# Patient Record
Sex: FEMALE | Race: WHITE | Employment: FULL TIME | URBAN - METROPOLITAN AREA
[De-identification: names, ages, dates, MRNs, and addresses within clinical notes are randomized per-mention and may not be internally consistent; named-entity substitution may affect disease eponyms.]

---

## 2020-12-31 ENCOUNTER — HOSPITAL ENCOUNTER (OUTPATIENT)
Dept: GENERAL RADIOLOGY | Age: 65
Discharge: HOME | End: 2020-12-31
Attending: NURSE PRACTITIONER
Payer: MEDICARE

## 2020-12-31 DIAGNOSIS — M54.40 ACUTE RIGHT-SIDED LOW BACK PAIN WITH SCIATICA, SCIATICA LATERALITY UNSPECIFIED: ICD-10-CM

## 2020-12-31 PROCEDURE — 72100 X-RAY EXAM L-S SPINE 2/3 VWS: CPT

## 2021-01-04 NOTE — PROGRESS NOTES
Pt notified of lumbar xray WNL. Advised to  continue with plan, and notify us if symptoms do not continue to improve.   HARSHADH/db

## 2021-01-27 ENCOUNTER — HOSPITAL ENCOUNTER (OUTPATIENT)
Dept: MRI IMAGING | Age: 66
Discharge: HOME | End: 2021-01-27
Attending: FAMILY MEDICINE
Payer: MEDICARE

## 2021-01-27 DIAGNOSIS — M54.9 BACK PAIN WITH RADIATION: ICD-10-CM

## 2021-01-27 PROCEDURE — 72148 MRI LUMBAR SPINE W/O DYE: CPT

## 2021-01-28 NOTE — PROGRESS NOTES
Per Dr. Sarita Cee: Notify shows arthritis and mild narrowing around nerve roots which is irritating to nerve. Does not not look like the type of thing that would need surgery but would refer to POA to see if injections would help and to get baseline exam.- Notified, verbalizes understanding. Would like to hold off on Ortho referral at this time. Prefers to try PT first. Will call back for this referral if needed.

## 2021-02-15 ENCOUNTER — HOSPITAL ENCOUNTER (OUTPATIENT)
Dept: PHYSICAL THERAPY | Age: 66
Discharge: HOME | End: 2021-02-15
Payer: MEDICARE

## 2021-02-15 PROCEDURE — 97110 THERAPEUTIC EXERCISES: CPT

## 2021-02-15 PROCEDURE — 97161 PT EVAL LOW COMPLEX 20 MIN: CPT

## 2021-02-15 NOTE — THERAPY EVALUATION
Eduardo Velez : 1955 [place] Secondary: [address] [facility] Therapy 
[address], [address] [phone]   [phone] OUTPATIENT PHYSICAL THERAPY:Initial Assessment 2/15/2021 ICD-10: Treatment Diagnosis: M54.5 Precautions/Allergies:  
Sulfa (sulfonamide antibiotics) TREATMENT PLAN: 
Effective Dates: 2/15/2021 TO 2021 (90 days). Frequency/Duration: 2 times a week for 90 Day(s) MEDICAL/REFERRING DIAGNOSIS: 
Dorsalgia, unspecified [M54.9] DATE OF ONSET: 2020 REFERRING PHYSICIAN: Viet Alfonso MD MD Orders: Maria A Ivan and treat Return MD Appointment:   
 
INITIAL ASSESSMENT:  Ms. Forrest Meehan presents to physical therapy with MD diagnosis of a low back pain. Pt demonstrated signs and symptoms consistent with this diagnosis. On objective examination, the patient demonstrated deficits in ROM, strength, joint mobility, soft tissue mobility. The patient also had increased pain. The patient is limited in the following activities: sitting, standing, walking, lifting, ADLs, work. The patient has a good  prognosis for recovery based on the examination findings and may be limited by: N/A. Patient requires skilled physical therapy services in order to return to prior level of function. PROBLEM LIST (Impacting functional limitations): 1. Decreased Strength 2. Decreased ADL/Functional Activities 3. Increased Pain 4. Decreased Activity Tolerance 5. Decreased Flexibility/Joint Mobility 6. Decreased Knowledge of Precautions 7. Decreased [place] with Home Exercise Program INTERVENTIONS PLANNED: (Treatment may consist of any combination of the following) 1. Cold 2. Heat 3. Home Exercise Program (HEP) 4. Manual Therapy 5. Neuromuscular Re-education/Strengthening 6. Range of Motion (ROM) 7. Therapeutic Activites 8. Therapeutic Exercise/Strengthening GOALS: (Goals have been discussed and agreed upon with patient.) Short-Term Functional Goals: Time Frame: 4 weeks 1. Pt will improve BAILEY score by 5pts 2. Pt will be compliant with progressive HEP Discharge Goals: Time Frame: 10 weeks 1. Pt will improve BAILEY score by 10pts 2. Pt will have 4+/5 glute max strength 3. Pt will be able to bend and lift 15lbs OUTCOME MEASURE:  
Tool Used: Modified Oswestry Low Back Pain Questionnaire Score:  Initial: 17/50  Most Recent: X/50 (Date: -- ) Interpretation of Score: Each section is scored on a 0-5 scale, 5 representing the greatest disability. The scores of each section are added together for a total score of 50. MEDICAL NECESSITY:  
· Patient is expected to demonstrate progress in strength, range of motion, coordination and functional technique to increase independence with ADLs and functionl tasks. REASON FOR SERVICES/OTHER COMMENTS: 
· Patient requires skilled physical therapy in order to return to prior level of function. Total Duration: 
  
 
Rehabilitation Potential For Stated Goals: Good Regarding Kim Fuentes's therapy, I certify that the treatment plan above will be carried out by a therapist or under their direction. Thank you for this referral, 
Carleen Rogers PT, DPT, OCS Referring Physician Signature: Sae Vasquez MD _______________________________ Date _____________ PAIN/SUBJECTIVE:  
Initial:   3/10 Post Session:  1/10 HISTORY:  
History of Injury/Illness (Reason for Referral): 
 Pt states her back started bothering her on 12/26/2020. Was doing a lot of lifting and getting things out of the attic, so she was stooping over and bending, as well as cleaning; no specific incident, just a lot of bending. Is sitting a lot with work now that she is at home. Reports that the pain is from the right hip to the right foot into the toes. Denies any L LE pain. Pain starts in the R hip, doesn't feel much in the R thigh, but feels pain/numb in the lateral shin/calf and into the toes and under the foot. Pt does not know if she has any weakness in the R LE. Majority of the numbness is in the bottom of the foot, some in the shin. Aggs: sitting, bending Eases: lying on back, standing, walking Past Medical History/Comorbidities: Ms. Yamilex Moses  has a past medical history of Abdominal abscess (6/15/2012), Abdominal pain (6/13/2012), Gangrenous appendicitis (6/15/2012), Peritonitis ([address] (6/15/2012), and Ruptured appendicitis (6/15/2012). She also has no past medical history of Aneurysm ([address] Arrhythmia, Arthritis, Asthma, Autoimmune disease ([address] CAD (coronary artery disease), Cancer ([address] Chronic kidney disease, Chronic pain, Coagulation defects, COPD, Diabetes ([address] GERD (gastroesophageal reflux disease), Heart failure ([address] Hypertension, Liver disease, Morbid obesity ([address] Other ill-defined conditions(799.89), Psychiatric disorder, PUD (peptic ulcer disease), Seizures ([address] Stroke ([address] Thromboembolus ([address] Thyroid disease, or Unspecified sleep apnea. Ms. Yamilex Moses  has a past surgical history that includes hx appendectomy. Social History/Living Environment:  
  Lives with family, no stairs Prior Level of Function/Work/Activity: 
Desk job, walking, house work Personal Factors:   
      Sex:  female Age:  76 y.o. Ambulatory/Rehab Services H2 Model Falls Risk Assessment Risk Factors: 
     No Risk Factors Identified Ability to Rise from Chair: (0)  Ability to rise in a single movement Falls Prevention Plan: No modifications necessary Total: (5 or greater = High Risk): 0  
©2010 [facility] of [employer] States Patent #[id number]. Federal Law prohibits the replication, distribution or use without written permission from [facility] of [employer] Current Medications:   
  
Current Outpatient Medications:  
  LORazepam (ATIVAN) 1 mg tablet, 1 po 1 hour prior to procedure. May repeat q4h if needed. , Disp: 4 Tab, Rfl: 0 
  methylPREDNISolone (MEDROL DOSEPACK) 4 mg tablet, TAD, Disp: 1 Dose Pack, Rfl: 0 
  gabapentin (NEURONTIN) 100 mg capsule, Take 3 Caps by mouth nightly as needed for Pain., Disp: 20 Cap, Rfl: 0 
  cyclobenzaprine (FLEXERIL) 10 mg tablet, Take 1 Tab by mouth nightly., Disp: 20 Tab, Rfl: 0 Date Last Reviewed:  02/15/21 Number of Personal Factors/Comorbidities that affect the Plan of Care: 1-2: MODERATE COMPLEXITY EXAMINATION:  
*= Painful     WNL= within normal limits     NT= not tested Observation Posture: normal 
Gait: normal 
Lateral Shift: none ROM Right Left Flexion 98deg Extension 32deg R/L Side Bend  35deg 35deg Strength (all MMT scores are graded on a scale of 0-5) Right Left Hip Flexion 5 5 Abduction 3+ 3+ Extension 4- 4-  
Glute Max 3+ 3+ Knee Extension 5 5 Flexion 5 5 Ankle Dorsiflexion 5 5 Plantarflexion 5 5 Neuro Screen All dermatomes and myotoms normal (some increased difficulty with SL calf raises on R LE) Reflexes Right Left Patellar Normal Normal  
Achilles Absent Normal  
 
 
Joint/Soft Tissue Mobility Description Joint Mobility ? Lumbar: Normal mobility, slight increase in pain with UPA/CPA at L4-5 Soft Tissue Mobility No TTP Special Tests Straight Leg Raise Right: (+) at 0deg (symptoms onset with DF in neutral) Left: (-) Slump: (+) Body Structures Involved: 1. Nerves 2. Bones 3. Joints 4. Muscles 5. Ligaments Body Functions Affected: 1. Sensory/Pain 2. Neuromusculoskeletal 
3. Movement Related Activities and Participation Affected: 1. General Tasks and Demands 2. Self Care 3. Domestic Life 4. Interpersonal Interactions and Relationships 5. Community, Social and [place] Number of elements (examined above) that affect the Plan of Care: 3: MODERATE COMPLEXITY CLINICAL PRESENTATION:  
Presentation: Stable and uncomplicated: LOW COMPLEXITY CLINICAL DECISION MAKING:  
Use of outcome tool(s) and clinical judgement create a POC that gives a: Clear prediction of patient's progress: LOW COMPLEXITY Rondall Romberg PT, DPT, OCS

## 2021-02-15 NOTE — PROGRESS NOTES
Roddy Hurd  : 1955  [place]  Secondary: [address] [facility] Therapy  [address], [address]  [phone]   [phone]         OUTPATIENT PHYSICAL THERAPY:Daily Note 2/15/2021      TREATMENT:   PT Patient Time In/Time Out  Time In: 1430  Time Out: 1515      Total Time: 45min  Visit Count:  1     ICD-10: Treatment Diagnosis: M54.5  Medication Last Reviewed: 02/15/21      TREATMENT PLAN  Effective Dates: 2/15/2021 TO 2021 (90 days). Frequency/Duration: 2 times a week for 90 Day(s)         Subjective: See Evaluation Note dated 2/15/2021 for details   Pain:     Objective: See Evaluation Note dated 2/15/2021 for details      Therapeutic Exercise: (30min) Done in order to improve strength, ROM and understanding of current condition. Date:  2/15 Date:   Date:   Date:     Activity/Exercise Parameters      Education Discussed examination findings, HEP, plan of care      Prone Press Ups 5x10 (3x10 w/ pillow under hips)      Bridges 3x10      Standing Lumbar Extension 2x10                      Access Code: [email or web address]  URL: [email or web address]  Date: 02/15/2021  Prepared by: Ashley Christian    Exercises  Supine Bridge - 10 reps - 3 sets - 2x daily - 7x weekly  Prone Press Up - 7x weekly    Manual Therapy: (0min) Done in order to improve joint and soft tissue mobility,reduce muscle guarding, and decrease muscle tone   Date:   Date:   Date:   Date:     Type Parameters      Joint Mobilization       Soft Tissue Mobilization           Modalities: (-) Done in order to reduce swelling and pain    Assessment: See Evaluation Note dated 2/15/2021 for details    Plan: See Evaluation Note dated 2/15/2021 for details    No future appointments.     Unbilled Time: 15min eval  Units: 1 eval low/ 2TE      Argelia Render, PT, DPT, OCS    Visit Approval Visit # Therapist initials Date A NS / Cx < 24 hr >24 hr Cx Comments    1 WJ 2/15 [x]  [] [] Initial evaluation       [] [] []        [] [] []        [] [] []        [] [] []        [] [] []        [] [] []        [] [] []        [] [] []        [] [] []        [] [] []        [] [] []        [] [] []        [] [] []        [] [] []        [] [] []        [] [] []        [] [] []

## 2021-02-19 ENCOUNTER — HOSPITAL ENCOUNTER (OUTPATIENT)
Dept: PHYSICAL THERAPY | Age: 66
Discharge: HOME | End: 2021-02-19
Payer: MEDICARE

## 2021-02-19 PROCEDURE — 97110 THERAPEUTIC EXERCISES: CPT

## 2021-02-19 NOTE — PROGRESS NOTES
Kevin Adkinsic  : 1955  [place]  Secondary: [address] [facility] Therapy  [address], [facility], [address]  [phone]   [phone]         OUTPATIENT PHYSICAL THERAPY:Daily Note 2021      TREATMENT:   PT Patient Time In/Time Out  Time In: 0200  Time Out: 0245      Total Time: 45min  Visit Count:  2     ICD-10: Treatment Diagnosis: M54.5  Medication Last Reviewed: 21      TREATMENT PLAN  Effective Dates: 2/15/2021 TO 2021 (90 days). Frequency/Duration: 2 times a week for 90 Day(s)         Subjective: Patient reports she is a little more painful in the mornings.  Pain:     Objective: None Today      Therapeutic Exercise: (45min) Done in order to improve strength, ROM and understanding of current condition. Date:  2/15 Date:   Date:   Date:     Activity/Exercise Parameters      Education Discussed examination findings, HEP, plan of care      Prone Press Ups 5x10 (3x10 w/ pillow under hips) 5 x 10 with overpressure     Bridges 3x10 3 x 10     Standing Lumbar Extension 2x10 3 x 10     Nu step   10 min     SLR  3 x 10     Trunk rotation   3 x 10     piriformis stretching  3 x 1 min      Hamstring stretching   3 x 1 min     Hip abd sidelying   1 x 10 ( had some difficulty)     Clams   2 x 10       Access Code: [email or web address]  URL: [email or web address]  Date: 02/15/2021  Prepared by: Anne Terry    Exercises  Supine Bridge - 10 reps - 3 sets - 2x daily - 7x weekly  Prone Press Up - 7x weekly    Manual Therapy: (0min) Done in order to improve joint and soft tissue mobility,reduce muscle guarding, and decrease muscle tone   Date:   Date:   Date:   Date:     Type Parameters      Joint Mobilization       Soft Tissue Mobilization           Modalities: (-) Done in order to reduce swelling and pain    Assessment: Patient tolerated treatment with mild discomfort.  Needs to work on core strength.      Plan: See Evaluation Note dated 2/15/2021 for details    Future Appointments   Date Time Provider Brian Mcmillan   2/19/2021  2:00 PM [facility]   2/26/2021  2:45 PM [facility]   3/1/2021  4:00 PM [facility] [place]   3/3/2021  4:00 PM Daryel [place], PT [facility]   3/8/2021  4:00 PM Daryel [place], PT [facility]   3/12/2021  2:45 PM Daryel [place], PT [facility]       Mary Aaron Time:   Units: 3 TE      Duglas You, DPT, OCS    Visit Approval Visit # Therapist initials Date A NS / Cx < 24 hr >24 hr Cx Comments    1 WJ 2/15 [x]  [] [] Initial evaluation    2  [address] 2-19 [x] [] []        [] [] []        [] [] []        [] [] []        [] [] []        [] [] []        [] [] []        [] [] []        [] [] []        [] [] []        [] [] []        [] [] []        [] [] []        [] [] []        [] [] []        [] [] []        [] [] []

## 2021-02-26 ENCOUNTER — HOSPITAL ENCOUNTER (OUTPATIENT)
Dept: PHYSICAL THERAPY | Age: 66
Discharge: HOME | End: 2021-02-26
Payer: MEDICARE

## 2021-02-26 PROCEDURE — 97110 THERAPEUTIC EXERCISES: CPT

## 2021-02-26 NOTE — PROGRESS NOTES
Roddy Hurd  : 1955  [place]  Secondary: [address] [facility] Therapy  [address], [facility], [address]  [phone]   [phone]         OUTPATIENT PHYSICAL THERAPY:Daily Note 2021      TREATMENT:   PT Patient Time In/Time Out  Time In: 2175  Time Out: 0315      Total Time: 45min  Visit Count:  3     ICD-10: Treatment Diagnosis: M54.5  Medication Last Reviewed: 21      TREATMENT PLAN  Effective Dates: 2/15/2021 TO 2021 (90 days). Frequency/Duration: 2 times a week for 90 Day(s)         Subjective: Patient reports she has been sore.  Pain: 1/10    Objective: None Today      Therapeutic Exercise: (45min) Done in order to improve strength, ROM and understanding of current condition. Date:  2/15 Date:   Date:  21 Date:     Activity/Exercise Parameters      Education Discussed examination findings, HEP, plan of care      Prone Press Ups 5x10 (3x10 w/ pillow under hips) 5 x 10 with overpressure     Bridges 3x10 3 x 10 3 x 10    Standing Lumbar Extension 2x10 3 x 10 3 x 10    Nu step   10 min 10 min level 2    SLR  3 x 10 3 x 10    Trunk rotation   3 x 10     piriformis stretching  3 x 1 min  3 x 1 min    Hamstring stretching   3 x 1 min     Hip abd sidelying   1 x 10 ( had some difficulty)     Clams   2 x 10     Hip abd standing   3 x 10    Sit to stand   2 x 12      Access Code: [email or web address]  URL: [email or web address]  Date: 02/15/2021  Prepared by:  Ashley Christian    Exercises  Supine Bridge - 10 reps - 3 sets - 2x daily - 7x weekly  Prone Press Up - 7x weekly    Manual Therapy: (0min) Done in order to improve joint and soft tissue mobility,reduce muscle guarding, and decrease muscle tone   Date:   Date:   Date:   Date:     Type Parameters      Joint Mobilization       Soft Tissue Mobilization           Modalities: (-) Done in order to reduce swelling and pain    Assessment: Patient tolerated treatment with mild discomfort. Patient demonstrated good effort with all exercises.     Plan: See Evaluation Note dated 2/15/2021 for details    Future Appointments   Date Time Provider Brian Mcmillan   3/1/2021  4:00 PM Tara Boykin   3/3/2021  4:00 PM Ugo Chavez, [facility]   3/8/2021  4:00 PM Ugo Chavez, PT [facility]   3/12/2021  2:45 PM Ugo Chavez, PT [facility]       Ankit Hernandes Time:   Units: 3 TE      Ricky Rock, DPT, OCS    Visit Approval Visit # Therapist initials Date A NS / Cx < 24 hr >24 hr Cx Comments    1 WJ 2/15 [x]  [] [] Initial evaluation    2  [address] 2-19 [x] [] []     3 [address] 2-26 [x] [] []        [] [] []        [] [] []        [] [] []        [] [] []        [] [] []        [] [] []        [] [] []        [] [] []        [] [] []        [] [] []        [] [] []        [] [] []        [] [] []        [] [] []        [] [] []

## 2021-03-01 ENCOUNTER — HOSPITAL ENCOUNTER (OUTPATIENT)
Dept: PHYSICAL THERAPY | Age: 66
Discharge: HOME | End: 2021-03-01
Payer: MEDICARE

## 2021-03-01 PROCEDURE — 97140 MANUAL THERAPY 1/> REGIONS: CPT

## 2021-03-01 PROCEDURE — 97110 THERAPEUTIC EXERCISES: CPT

## 2021-03-01 NOTE — PROGRESS NOTES
Pieter Adamson  : 1955  [place]  Secondary: [address] [facility] Therapy  [address], [address], [address]  [phone]   [phone]         OUTPATIENT PHYSICAL THERAPY:Daily Note 3/1/2021      TREATMENT:   PT Patient Time In/Time Out  Time In: 1603  Time Out: 1653      Total Time: 50min  Visit Count:  4     ICD-10: Treatment Diagnosis: M54.5  Medication Last Reviewed: 21      TREATMENT PLAN  Effective Dates: 2/15/2021 TO 2021 (90 days). Frequency/Duration: 2 times a week for 90 Day(s)         Subjective: Patient reports she has been sore.  Pain: 1/10    Objective: None Today      Therapeutic Exercise: (30min) Done in order to improve strength, ROM and understanding of current condition. Date:  2/15 Date:   Date:  21 Date:  3/1   Activity/Exercise Parameters      Education Discussed examination findings, HEP, plan of care   Mini-assessment   Prone Press Ups 5x10 (3x10 w/ pillow under hips) 5 x 10 with overpressure  4x10 (w/ right lateral shift)3x10   Bridges 3x10 3 x 10 3 x 10    Standing Lumbar Extension 2x10 3 x 10 3 x 10 2x10 (w/ right lateral shift)   Nu step   10 min 10 min level 2 x10min lvl 2-3   SLR  3 x 10 3 x 10    Trunk rotation   3 x 10     piriformis stretching  3 x 1 min  3 x 1 min    Hamstring stretching   3 x 1 min     Hip abd sidelying   1 x 10 ( had some difficulty)     Clams   2 x 10     Hip abd standing   3 x 10    Sit to stand   2 x 12    Lateral Shift    3x10 (hips to L) standing                                 Access Code: [email or web address]  URL: [email or web address]  Date: 02/15/2021  Prepared by:  Vivian Or    Exercises  Supine Bridge - 10 reps - 3 sets - 2x daily - 7x weekly  Prone Press Up - 7x weekly    Manual Therapy: (12min) Done in order to improve joint and soft tissue mobility,reduce muscle guarding, and decrease muscle tone   Date:  3/1 Date:   Date:   Date:     Type Parameters      Joint Mobilization L3-5: CPA/UPA grades II-III      Soft Tissue Mobilization           Modalities: (-) Done in order to reduce swelling and pain    Assessment: Patient tolerated treatment with mild discomfort. Patient demonstrated good effort with all exercises.     Plan: See Evaluation Note dated 2/15/2021 for details    Future Appointments   Date Time Provider Brian Mcmillan   3/3/2021  4:00 PM Tara Law   3/8/2021  4:00 PM PILY [facility]   3/12/2021  2:45 PM [facility]       Keiko Grebe Time: 7min unsupervised  Units: 2 TE/ 1MT      Ann Marie Whyte, PT, DPT, OCS    Visit Approval Visit # Therapist initials Date A NS / Cx < 24 hr >24 hr Cx Comments    1 WJ 2/15 [x]  [] [] Initial evaluation    2  [address] 2-19 [x] [] []     3 [address] 2-26 [x] [] []     4 WJ 3/1 [] [] []        [] [] []        [] [] []        [] [] []        [] [] []        [] [] []        [] [] []        [] [] []        [] [] []        [] [] []        [] [] []        [] [] []        [] [] []        [] [] []        [] [] []

## 2021-03-03 ENCOUNTER — HOSPITAL ENCOUNTER (OUTPATIENT)
Dept: PHYSICAL THERAPY | Age: 66
Discharge: HOME | End: 2021-03-03
Payer: MEDICARE

## 2021-03-03 PROCEDURE — 97110 THERAPEUTIC EXERCISES: CPT

## 2021-03-03 NOTE — PROGRESS NOTES
Virginia Bauer  : 1955  [place]  Secondary: [address] at [facility] Therapy  [address], [facility], [address]  [phone]   [phone]         OUTPATIENT PHYSICAL THERAPY:Daily Note 3/3/2021      TREATMENT:   PT Patient Time In/Time Out  Time In: 1600  Time Out: 1645      Total Time: 45min  Visit Count:  5     ICD-10: Treatment Diagnosis: M54.5  Medication Last Reviewed: 21      TREATMENT PLAN  Effective Dates: 2/15/2021 TO 2021 (90 days). Frequency/Duration: 2 times a week for 90 Day(s)         Subjective: Patient reports she felt great following the last session, yesterday was her best day in a long time. Is a little worse today due to getting a bad cramp in her calf    Pain: 1/10    Objective: None Today      Therapeutic Exercise: (45min) Done in order to improve strength, ROM and understanding of current condition. Date:   Date:  21 Date:  3/1 Date:  3/3   Activity/Exercise       Education   Mini-assessment    Prone Press Ups 5 x 10 with overpressure  4x10 (w/ right lateral shift)3x10    Bridges 3 x 10 3 x 10  · 3x10 normal  · 3x5 w/ SL clam  · 3x5 w/ march   Standing Lumbar Extension 3 x 10 3 x 10 2x10 (w/ right lateral shift)    Nu step  10 min 10 min level 2 x10min lvl 2-3 x10min lvl 2-3   SLR 3 x 10 3 x 10     Trunk rotation  3 x 10      piriformis stretching 3 x 1 min  3 x 1 min     Hamstring stretching  3 x 1 min      Hip abd sidelying  1 x 10 ( had some difficulty)      Clams  2 x 10      Hip abd standing  3 x 10     Sit to stand  2 x 12     Lateral Shift   3x10 (hips to L) standing · 4x10 standing  · 2x6 w/ lumbar extension   [place] Press    · 3x10 B blue TB  · 2x10 CW/CCW B blue TB                          Access Code: [email or web address]  URL: [email or web address]  Date: 02/15/2021  Prepared by:  Robina Romero    Exercises  Supine Bridge - 10 reps - 3 sets - 2x daily - 7x weekly  Prone Press Up - 7x weekly    Manual Therapy: (0min) Done in order to improve joint and soft tissue mobility,reduce muscle guarding, and decrease muscle tone   Date:  3/1 Date:   Date:   Date:     Type Parameters      Joint Mobilization L3-5: CPA/UPA grades II-III      Soft Tissue Mobilization           Modalities: (-) Done in order to reduce swelling and pain    Assessment: Patient able to progress strengthening with no increased discomfort    Plan: continue per POC    Future Appointments   Date Time Provider Brian Mcmillan   3/8/2021  4:00 PM Vernel Pleasure, PT [facility]   3/12/2021  2:45 PM Vernel Pleasure, PT [facility]   3/15/2021  3:15 PM Vernel Pleasure, PT [facility]   3/19/2021  2:45 PM Vernel Pleasure, PT [facility]   3/22/2021  4:00 PM Camillia Couch [facility]   3/26/2021  2:45 PM Camillia Couch [facility]   3/29/2021  4:00 PM Camillia Couch [facility]       Gabriel Suarez Time:   Units: 3TE      Nilda Peterson, PT, DPT, OCS    Visit Approval Visit # Therapist initials Date A NS / Cx < 24 hr >24 hr Cx Comments    1 WJ 2/15 [x]  [] [] Initial evaluation    2  [address] 2-19 [x] [] []     3 [address] 2-26 [x] [] []     4 WJ 3/1 [x] [] []     5 WJ 3/3 [x] [] []     6   [] [] []     7   [] [] []     8   [] [] []     9   [] [] []     10   [] [] []        [] [] []        [] [] []        [] [] []        [] [] []        [] [] []        [] [] []        [] [] []        [] [] []

## 2021-03-08 ENCOUNTER — HOSPITAL ENCOUNTER (OUTPATIENT)
Dept: PHYSICAL THERAPY | Age: 66
Discharge: HOME | End: 2021-03-08
Payer: MEDICARE

## 2021-03-08 NOTE — PROGRESS NOTES
Kemi Lugo  : 1955  [place]  Secondary: [address] [facility] Therapy  [address], [facility] [address]  [phone]   [phone]        OUTPATIENT DAILY NOTE    NAME/AGE/GENDER: Kemi Lugo is a 76 y.o. female. DATE: 3/8/2021    Ms. Stefanie Christine for today's appointment due to no reason given.     Rhett Alonso, PT

## 2021-03-19 ENCOUNTER — HOSPITAL ENCOUNTER (OUTPATIENT)
Dept: PHYSICAL THERAPY | Age: 66
Discharge: HOME | End: 2021-03-19
Payer: MEDICARE

## 2021-03-22 ENCOUNTER — APPOINTMENT (OUTPATIENT)
Dept: PHYSICAL THERAPY | Age: 66
End: 2021-03-22
Payer: MEDICARE

## 2021-03-22 NOTE — PROGRESS NOTES
Shavonne Santizo  : 1955  [place]  Secondary: [address] [facility]  [address], [facility] [address]  [phone]   [phone]        OUTPATIENT DAILY NOTE    NAME/AGE/GENDER: Shavonne Santizo is a 76 y.o. female. DATE: 3/22/2021    Ms. Mamta Gamez for today's appointment due to patient states she has a lot going on at work and is having a hard time getting away for therapy. States that she is doing a lot better and feels comfortable discontinuing at this time and doing her HEP on her own. Will call back if she needs anything.     Barron Pineda, PT

## 2021-03-26 ENCOUNTER — APPOINTMENT (OUTPATIENT)
Dept: PHYSICAL THERAPY | Age: 66
End: 2021-03-26
Payer: MEDICARE

## 2021-03-29 ENCOUNTER — APPOINTMENT (OUTPATIENT)
Dept: PHYSICAL THERAPY | Age: 66
End: 2021-03-29
Payer: MEDICARE

## 2021-06-28 NOTE — THERAPY DISCHARGE
Jaye Parra  : 1955  [place]  Secondary: [address] [facility]  [address]  [phone]   [phone]        OUTPATIENT DAILY NOTE    NAME/AGE/GENDER: Jaye Parra is a 76 y.o. female. DATE: 2021    Ms. Bandar Montiel for today's appointment due to patient states she has a lot going on at work and is having a hard time getting away for therapy. States that she is doing a lot better and feels comfortable discontinuing at this time and doing her HEP on her own. Will call back if she needs anything.     Vinicius Austin, PT

## 2023-03-10 ENCOUNTER — TELEMEDICINE (OUTPATIENT)
Dept: FAMILY MEDICINE CLINIC | Facility: CLINIC | Age: 68
End: 2023-03-10
Payer: MEDICARE

## 2023-03-10 DIAGNOSIS — H01.9 DERMATITIS OF EYELID OF RIGHT EYE, UNSPECIFIED TYPE: Primary | ICD-10-CM

## 2023-03-10 PROCEDURE — G8400 PT W/DXA NO RESULTS DOC: HCPCS | Performed by: NURSE PRACTITIONER

## 2023-03-10 PROCEDURE — 3017F COLORECTAL CA SCREEN DOC REV: CPT | Performed by: NURSE PRACTITIONER

## 2023-03-10 PROCEDURE — 99213 OFFICE O/P EST LOW 20 MIN: CPT | Performed by: NURSE PRACTITIONER

## 2023-03-10 PROCEDURE — 1123F ACP DISCUSS/DSCN MKR DOCD: CPT | Performed by: NURSE PRACTITIONER

## 2023-03-10 PROCEDURE — 1090F PRES/ABSN URINE INCON ASSESS: CPT | Performed by: NURSE PRACTITIONER

## 2023-03-10 PROCEDURE — G8427 DOCREV CUR MEDS BY ELIG CLIN: HCPCS | Performed by: NURSE PRACTITIONER

## 2023-03-10 RX ORDER — CLOTRIMAZOLE AND BETAMETHASONE DIPROPIONATE 10; .64 MG/G; MG/G
CREAM TOPICAL
Qty: 45 G | Refills: 0 | Status: SHIPPED | OUTPATIENT
Start: 2023-03-10

## 2023-03-10 ASSESSMENT — ENCOUNTER SYMPTOMS
NAUSEA: 0
DIARRHEA: 0
VOMITING: 0
SHORTNESS OF BREATH: 0
SORE THROAT: 0
BACK PAIN: 0
EYE REDNESS: 0
EYE PAIN: 0
SINUS PAIN: 0
COUGH: 0
BLOOD IN STOOL: 0
CONSTIPATION: 0

## 2023-03-10 NOTE — PROGRESS NOTES
Alexandra Posada (:  1955) is a Established patient, here for evaluation of the following:    Assessment & Plan   Below is the assessment and plan developed based on review of pertinent history, physical exam, labs, studies, and medications. 1. Dermatitis of eyelid of right eye, unspecified type  -     clotrimazole-betamethasone (LOTRISONE) 1-0.05 % cream; Apply topically 2 times daily. , Disp-45 g, R-0, Normal    Discussed medications, side effects and risks versus benefits in detail. Instructed to call if no improvement in 2 weeks. Due for AWV. Encouraged to schedule. Return if symptoms worsen or fail to improve. Subjective   HPI  Rash. Recurrent. Current episode 1 week duration. Worsening. Rough, red patches to R eyelid. Now patches appearing on L eyelid. No known aggravating factors. Relieved by a cream she has been prescribed in the past but cannot remember the name. No visual changes or problems endorsed. Review of Systems   Constitutional:  Negative for chills and fever. HENT:  Negative for congestion, ear pain, sinus pain and sore throat. Eyes:  Negative for pain and redness. Respiratory:  Negative for cough and shortness of breath. Cardiovascular:  Negative for chest pain and palpitations. Gastrointestinal:  Negative for blood in stool, constipation, diarrhea, nausea and vomiting. Endocrine: Negative for polydipsia. Genitourinary:  Negative for dysuria, frequency and urgency. Musculoskeletal:  Negative for arthralgias, back pain and myalgias. Skin:  Positive for rash. Allergic/Immunologic: Negative for environmental allergies. Neurological:  Negative for dizziness and headaches. Psychiatric/Behavioral:  Negative for hallucinations and suicidal ideas.          Objective   Patient-Reported Vitals  No data recorded     Physical Exam  [INSTRUCTIONS:  \"[x]\" Indicates a positive item  \"[]\" Indicates a negative item  -- DELETE ALL ITEMS NOT EXAMINED]    Constitutional: [x] Appears well-developed and well-nourished [x] No apparent distress      [] Abnormal -     Mental status: [x] Alert and awake  [x] Oriented to person/place/time [x] Able to follow commands    [] Abnormal -     Eyes:   EOM    [x]  Normal    [] Abnormal -   Sclera  [x]  Normal    [] Abnormal -          Discharge [x]  None visible   [] Abnormal -     HENT: [x] Normocephalic, atraumatic  [] Abnormal -   [x] Mouth/Throat: Mucous membranes are moist    External Ears [x] Normal  [] Abnormal -    Neck: [x] No visualized mass [] Abnormal -     Pulmonary/Chest: [x] Respiratory effort normal   [x] No visualized signs of difficulty breathing or respiratory distress        [] Abnormal -      Musculoskeletal:   [x] Normal gait with no signs of ataxia         [x] Normal range of motion of neck        [] Abnormal -     Neurological:        [x] No Facial Asymmetry (Cranial nerve 7 motor function) (limited exam due to video visit)          [x] No gaze palsy        [] Abnormal -          Skin:        [x] No significant exanthematous lesions or discoloration noted on facial skin         [] Abnormal -            Psychiatric:       [x] Normal Affect [] Abnormal -        [x] No Hallucinations    Other pertinent observable physical exam findings:- Erythematous patches noted to R upper eyelid. No streaking or drainage noted. On this date 3/10/2023 I have spent 20-29 minutes reviewing previous notes, test results and face to face (virtual) with the patient discussing the diagnosis and importance of compliance with the treatment plan as well as documenting on the day of the visit. [facility], was evaluated through a synchronous (real-time) audio-video encounter. The patient (or guardian if applicable) is aware that this is a billable service, which includes applicable co-pays. This Virtual Visit was conducted with patient's (and/or legal guardian's) consent.  The visit was conducted pursuant to the emergency declaration under the [facility], [address] [place] [address] authority and the [employer] and [employer] General Act. Patient identification was verified, and a caregiver was present when appropriate.    The patient was located at [place] [phone]  Provider was located at Home ([address]): [address], [facility]

## 2023-08-02 ENCOUNTER — TELEPHONE (OUTPATIENT)
Dept: FAMILY MEDICINE CLINIC | Facility: CLINIC | Age: 68
End: 2023-08-02

## 2023-08-02 NOTE — TELEPHONE ENCOUNTER
Pt states one of her ears feels clogged for about a week. She has an appt on 8/9 to have it looked at but she is concerned. She asks what could she be doing in the meantime to care for her ear while she waits for the appt.      Call pt back at [phone]

## 2023-08-03 NOTE — TELEPHONE ENCOUNTER
Advised pt to go to [facility] to have her ears looked at if she feels she cannot wait until next week. Cannot send anything as unsure exactly what/where the issue is.

## 2023-08-09 ENCOUNTER — OFFICE VISIT (OUTPATIENT)
Dept: FAMILY MEDICINE CLINIC | Facility: CLINIC | Age: 68
End: 2023-08-09
Payer: MEDICARE

## 2023-08-09 VITALS
OXYGEN SATURATION: 99 % | DIASTOLIC BLOOD PRESSURE: 74 MMHG | WEIGHT: 128 LBS | HEART RATE: 62 BPM | SYSTOLIC BLOOD PRESSURE: 116 MMHG | TEMPERATURE: 97.6 F

## 2023-08-09 DIAGNOSIS — E28.39 ESTROGEN DEFICIENCY: Primary | ICD-10-CM

## 2023-08-09 DIAGNOSIS — Z12.11 COLON CANCER SCREENING: ICD-10-CM

## 2023-08-09 DIAGNOSIS — Z12.31 SCREENING MAMMOGRAM, ENCOUNTER FOR: ICD-10-CM

## 2023-08-09 DIAGNOSIS — H61.21 IMPACTED CERUMEN OF RIGHT EAR: ICD-10-CM

## 2023-08-09 DIAGNOSIS — R53.83 FATIGUE, UNSPECIFIED TYPE: ICD-10-CM

## 2023-08-09 DIAGNOSIS — Z13.220 SCREENING, LIPID: ICD-10-CM

## 2023-08-09 PROCEDURE — G8427 DOCREV CUR MEDS BY ELIG CLIN: HCPCS | Performed by: FAMILY MEDICINE

## 2023-08-09 PROCEDURE — 99213 OFFICE O/P EST LOW 20 MIN: CPT | Performed by: FAMILY MEDICINE

## 2023-08-09 PROCEDURE — G8400 PT W/DXA NO RESULTS DOC: HCPCS | Performed by: FAMILY MEDICINE

## 2023-08-09 PROCEDURE — G8421 BMI NOT CALCULATED: HCPCS | Performed by: FAMILY MEDICINE

## 2023-08-09 PROCEDURE — 3017F COLORECTAL CA SCREEN DOC REV: CPT | Performed by: FAMILY MEDICINE

## 2023-08-09 PROCEDURE — 1036F TOBACCO NON-USER: CPT | Performed by: FAMILY MEDICINE

## 2023-08-09 PROCEDURE — 1090F PRES/ABSN URINE INCON ASSESS: CPT | Performed by: FAMILY MEDICINE

## 2023-08-09 PROCEDURE — 69210 REMOVE IMPACTED EAR WAX UNI: CPT | Performed by: FAMILY MEDICINE

## 2023-08-09 PROCEDURE — 1123F ACP DISCUSS/DSCN MKR DOCD: CPT | Performed by: FAMILY MEDICINE

## 2023-08-09 SDOH — ECONOMIC STABILITY: INCOME INSECURITY: HOW HARD IS IT FOR YOU TO PAY FOR THE VERY BASICS LIKE FOOD, HOUSING, MEDICAL CARE, AND HEATING?: NOT HARD AT ALL

## 2023-08-09 SDOH — ECONOMIC STABILITY: HOUSING INSECURITY
IN THE LAST 12 MONTHS, WAS THERE A TIME WHEN YOU DID NOT HAVE A STEADY PLACE TO SLEEP OR SLEPT IN A SHELTER (INCLUDING NOW)?: NO

## 2023-08-09 SDOH — ECONOMIC STABILITY: FOOD INSECURITY: WITHIN THE PAST 12 MONTHS, YOU WORRIED THAT YOUR FOOD WOULD RUN OUT BEFORE YOU GOT MONEY TO BUY MORE.: NEVER TRUE

## 2023-08-09 SDOH — ECONOMIC STABILITY: FOOD INSECURITY: WITHIN THE PAST 12 MONTHS, THE FOOD YOU BOUGHT JUST DIDN'T LAST AND YOU DIDN'T HAVE MONEY TO GET MORE.: NEVER TRUE

## 2023-08-09 ASSESSMENT — ENCOUNTER SYMPTOMS
SINUS PAIN: 0
CONSTIPATION: 0
DIARRHEA: 0
COUGH: 0
CHEST TIGHTNESS: 0
EYE DISCHARGE: 0
ABDOMINAL PAIN: 0
SHORTNESS OF BREATH: 0

## 2023-08-09 ASSESSMENT — PATIENT HEALTH QUESTIONNAIRE - PHQ9
SUM OF ALL RESPONSES TO PHQ QUESTIONS 1-9: 0
2. FEELING DOWN, DEPRESSED OR HOPELESS: 0
SUM OF ALL RESPONSES TO PHQ9 QUESTIONS 1 & 2: 0
1. LITTLE INTEREST OR PLEASURE IN DOING THINGS: 0

## 2023-08-09 NOTE — PROGRESS NOTES
Trey Suarez is a 75 y.o. female who presents with   Chief Complaint   Patient presents with    Ear Fullness     Right. Sxs started when pressed on ears during loud section at movie theater 2 weeks ago       History of Present Illness  Pt with clogged R ear since plugging it at loud movie. No pain. Mild fatigue on occas. Overdue for health maintenance. BP has been good. No hx of elevated lipids. Review of Systems  Review of Systems   Constitutional:  Positive for fatigue (mild). Negative for appetite change and fever. HENT:  Positive for hearing loss (R ear). Negative for congestion, ear pain and sinus pain. Eyes:  Negative for discharge. Respiratory:  Negative for cough, chest tightness and shortness of breath. Cardiovascular:  Negative for chest pain, palpitations and leg swelling. Gastrointestinal:  Negative for abdominal pain, constipation and diarrhea. Genitourinary:  Negative for dysuria. Musculoskeletal:  Negative for joint swelling. Skin:  Negative for rash. Neurological:  Negative for headaches. Hematological:  Negative for adenopathy. Psychiatric/Behavioral:  Negative for dysphoric mood. The patient is not nervous/anxious. Medications  Current Outpatient Medications   Medication Sig Dispense Refill    clotrimazole-betamethasone (LOTRISONE) 1-0.05 % cream Apply topically 2 times daily. (Patient not taking: Reported on 8/9/2023) 45 g 0     No current facility-administered medications for this visit.         Past Medical History  Past Medical History:   Diagnosis Date    Abdominal abscess 6/15/2012    Abdominal pain 6/13/2012    Gangrenous appendicitis 6/15/2012    Peritonitis ([address]) 6/15/2012    Ruptured appendicitis 6/15/2012       Surgical History  Past Surgical History:   Procedure Laterality Date    APPENDECTOMY            Family History  Family History   Problem Relation Age of Onset    No Known Problems Mother     Other Father         MVA       Social

## 2023-09-13 NOTE — PROGRESS NOTES
aspirin    2. Bowel prep    3. Colonoscopy with MAC. I went through the risks of bleeding, perforation of the colon and cardiopulmonary problems that can develop with the use of anesthesia.     Patrick Matthews MD    [facility]   9/13/2023  9:59 AM

## 2023-09-18 ENCOUNTER — OFFICE VISIT (OUTPATIENT)
Dept: SURGERY | Age: 68
End: 2023-09-18

## 2023-09-18 DIAGNOSIS — Z12.11 ENCOUNTER FOR SCREENING COLONOSCOPY: Primary | ICD-10-CM

## 2023-09-19 ENCOUNTER — HOSPITAL ENCOUNTER (OUTPATIENT)
Dept: MAMMOGRAPHY | Age: 68
Discharge: HOME | End: 2023-09-22
Attending: FAMILY MEDICINE
Payer: MEDICARE

## 2023-09-19 VITALS — BODY MASS INDEX: 21.34 KG/M2 | WEIGHT: 125 LBS | HEIGHT: 64 IN

## 2023-09-19 VITALS — HEIGHT: 64 IN | BODY MASS INDEX: 21.34 KG/M2 | WEIGHT: 125 LBS

## 2023-09-19 DIAGNOSIS — Z12.31 SCREENING MAMMOGRAM, ENCOUNTER FOR: ICD-10-CM

## 2023-09-19 DIAGNOSIS — E28.39 ESTROGEN DEFICIENCY: ICD-10-CM

## 2023-09-19 PROCEDURE — 77080 DXA BONE DENSITY AXIAL: CPT

## 2023-09-19 PROCEDURE — 77063 BREAST TOMOSYNTHESIS BI: CPT

## 2023-09-20 ENCOUNTER — PREP FOR PROCEDURE (OUTPATIENT)
Dept: SURGERY | Age: 68
End: 2023-09-20

## 2023-09-20 DIAGNOSIS — Z12.11 ENCOUNTER FOR SCREENING COLONOSCOPY: ICD-10-CM

## 2023-09-21 DIAGNOSIS — R53.83 FATIGUE, UNSPECIFIED TYPE: ICD-10-CM

## 2023-09-21 DIAGNOSIS — Z13.220 SCREENING, LIPID: ICD-10-CM

## 2023-09-21 LAB
ALBUMIN SERPL-MCNC: 3.7 G/DL (ref 3.2–4.6)
ALBUMIN/GLOB SERPL: 1.2 (ref 0.4–1.6)
ALP SERPL-CCNC: 91 U/L (ref 50–136)
ALT SERPL-CCNC: 23 U/L (ref 12–65)
ANION GAP SERPL CALC-SCNC: 2 MMOL/L (ref 2–11)
AST SERPL-CCNC: 22 U/L (ref 15–37)
BASOPHILS # BLD: 0 K/UL (ref 0–0.2)
BASOPHILS NFR BLD: 1 % (ref 0–2)
BILIRUB SERPL-MCNC: 0.4 MG/DL (ref 0.2–1.1)
BUN SERPL-MCNC: 12 MG/DL (ref 8–23)
CALCIUM SERPL-MCNC: 9.3 MG/DL (ref 8.3–10.4)
CHLORIDE SERPL-SCNC: 110 MMOL/L (ref 101–110)
CHOLEST SERPL-MCNC: 260 MG/DL
CO2 SERPL-SCNC: 30 MMOL/L (ref 21–32)
CREAT SERPL-MCNC: 0.7 MG/DL (ref 0.6–1)
DIFFERENTIAL METHOD BLD: NORMAL
EOSINOPHIL # BLD: 0.1 K/UL (ref 0–0.8)
EOSINOPHIL NFR BLD: 2 % (ref 0.5–7.8)
ERYTHROCYTE [DISTWIDTH] IN BLOOD BY AUTOMATED COUNT: 13 % (ref 11.9–14.6)
GLOBULIN SER CALC-MCNC: 3 G/DL (ref 2.8–4.5)
GLUCOSE SERPL-MCNC: 86 MG/DL (ref 65–100)
HCT VFR BLD AUTO: 43.8 % (ref 35.8–46.3)
HDLC SERPL-MCNC: 72 MG/DL (ref 40–60)
HDLC SERPL: 3.6
HGB BLD-MCNC: 13.8 G/DL (ref 11.7–15.4)
IMM GRANULOCYTES # BLD AUTO: 0 K/UL (ref 0–0.5)
IMM GRANULOCYTES NFR BLD AUTO: 0 % (ref 0–5)
LDLC SERPL CALC-MCNC: 174 MG/DL
LYMPHOCYTES # BLD: 2.1 K/UL (ref 0.5–4.6)
LYMPHOCYTES NFR BLD: 35 % (ref 13–44)
MCH RBC QN AUTO: 29.2 PG (ref 26.1–32.9)
MCHC RBC AUTO-ENTMCNC: 31.5 G/DL (ref 31.4–35)
MCV RBC AUTO: 92.6 FL (ref 82–102)
MONOCYTES # BLD: 0.4 K/UL (ref 0.1–1.3)
MONOCYTES NFR BLD: 8 % (ref 4–12)
NEUTS SEG # BLD: 3.2 K/UL (ref 1.7–8.2)
NEUTS SEG NFR BLD: 54 % (ref 43–78)
NRBC # BLD: 0 K/UL (ref 0–0.2)
PLATELET # BLD AUTO: 233 K/UL (ref 150–450)
PMV BLD AUTO: 10.7 FL (ref 9.4–12.3)
POTASSIUM SERPL-SCNC: 4.1 MMOL/L (ref 3.5–5.1)
PROT SERPL-MCNC: 6.7 G/DL (ref 6.3–8.2)
RBC # BLD AUTO: 4.73 M/UL (ref 4.05–5.2)
SODIUM SERPL-SCNC: 142 MMOL/L (ref 133–143)
TRIGL SERPL-MCNC: 70 MG/DL (ref 35–150)
TSH W FREE THYROID IF ABNORMAL: 2.08 UIU/ML (ref 0.36–3.74)
VLDLC SERPL CALC-MCNC: 14 MG/DL (ref 6–23)
WBC # BLD AUTO: 5.8 K/UL (ref 4.3–11.1)

## 2023-09-22 ENCOUNTER — TELEPHONE (OUTPATIENT)
Dept: SURGERY | Age: 68
End: 2023-09-22

## 2023-09-22 NOTE — TELEPHONE ENCOUNTER
9/21-  Called patient twice (2x) to confirm scheduled Colonoscopy; unable to L/V/M due to VM full.    9/22-  Spoke w/ patient; confirmed scheduled Colonoscopy on Monday, 10/16/2023 @ 9:45 am at [facility] w/ Dr. Manish Dixon. Patient informed that prep instructions will be sent via [address] [place] [address],[address]. Mail. Patient verbalized understanding.

## 2023-09-24 DIAGNOSIS — E78.2 MIXED HYPERLIPIDEMIA: Primary | ICD-10-CM

## 2023-10-12 NOTE — PERIOP NOTE
Patient verified name, , and procedure. Type: 1a; abbreviated assessment per anesthesia guidelines    Labs per anesthesia: None    Instructed pt that they will be notified the day before their procedure by the GI Lab for time of arrival if their procedure is [facility] and Pre-op for [place] cases. Arrival times should be called by 5 pm. If no phone is received the patient should contact their respective hospital. The GI lab telephone number is [phone] and ES Pre-op is [phone]. Follow diet and prep instructions per office including NPO status. If patient has NOT received instructions from office patient is advised to call surgeon office, verbalizes understanding. Bath or shower the night before and the am of surgery with non-moisturizing soap. No lotions, oils, powders, cologne on skin. No make up, eye make up or jewelry. Wear loose fitting comfortable, clean clothing. Must have adult present in building the entire time . Medications for the day of procedure NONE, patient to hold vitamins, supplements, herbals, and NSAID's starting now per anesthesia guidelines. The following discharge instructions reviewed with patient: medication given during procedure may cause drowsiness for several hours, therefore, do not drive or operate machinery for remainder of the day. You may not drink alcohol on the day of your procedure, please resume regular diet and activity unless otherwise directed. You may experience abdominal distention for several hours that is relieved by the passage of gas. Contact your physician if you have any of the following: fever or chills, severe abdominal pain or excessive amount of bleeding or a large amount when having a bowel movement.  Occasional specks of blood with bowel movement would not be unusual.

## 2023-10-12 NOTE — H&P
aspirin    2. Bowel prep    3. Colonoscopy with MAC. I went through the risks of bleeding, perforation of the colon and cardiopulmonary problems that can develop with the use of anesthesia.     Chyna Carcamo MD    [facility]   10/12/2023  7:52 PM

## 2023-10-13 ENCOUNTER — TELEPHONE (OUTPATIENT)
Dept: SURGERY | Age: 68
End: 2023-10-13

## 2023-10-13 NOTE — TELEPHONE ENCOUNTER
[facility] scheduled for: 10/16/23       *Has patient picked up medications Miralax, Dulcolax, Gatorade or drink of choice-not red)? Yes       *Discussed instructions for how to take these and instructions for the week prior to your procedure? Yes       *Discussed instructions for the next couple of days? Yes       *Patient reminded of location of procedure and time of arrival?     Yes       *Who will be bringing patient and staying at the hospital with them during your procedure? yes      *Informed patient that they should receive a call from the hospital as well to pre-register them for this procedure? Yes       *Informed them of contact info if needed to cancel or reschedule this procedure?      Yes

## 2023-10-15 ENCOUNTER — ANESTHESIA EVENT (OUTPATIENT)
Dept: ENDOSCOPY | Age: 68
End: 2023-10-15
Payer: MEDICARE

## 2023-10-15 RX ORDER — SODIUM CHLORIDE 0.9 % (FLUSH) 0.9 %
5-40 SYRINGE (ML) INJECTION PRN
Status: CANCELLED | OUTPATIENT
Start: 2023-10-15

## 2023-10-15 RX ORDER — SODIUM CHLORIDE, SODIUM LACTATE, POTASSIUM CHLORIDE, CALCIUM CHLORIDE 600; 310; 30; 20 MG/100ML; MG/100ML; MG/100ML; MG/100ML
INJECTION, SOLUTION INTRAVENOUS CONTINUOUS
Status: CANCELLED | OUTPATIENT
Start: 2023-10-15

## 2023-10-15 RX ORDER — DEXTROSE MONOHYDRATE 100 MG/ML
INJECTION, SOLUTION INTRAVENOUS CONTINUOUS PRN
Status: CANCELLED | OUTPATIENT
Start: 2023-10-15

## 2023-10-15 RX ORDER — SODIUM CHLORIDE 9 MG/ML
INJECTION, SOLUTION INTRAVENOUS PRN
Status: CANCELLED | OUTPATIENT
Start: 2023-10-15

## 2023-10-15 RX ORDER — ONDANSETRON 2 MG/ML
4 INJECTION INTRAMUSCULAR; INTRAVENOUS
Status: CANCELLED | OUTPATIENT
Start: 2023-10-15 | End: 2023-10-16

## 2023-10-15 RX ORDER — SODIUM CHLORIDE 0.9 % (FLUSH) 0.9 %
5-40 SYRINGE (ML) INJECTION EVERY 12 HOURS SCHEDULED
Status: CANCELLED | OUTPATIENT
Start: 2023-10-15

## 2023-10-16 ENCOUNTER — ANESTHESIA (OUTPATIENT)
Dept: ENDOSCOPY | Age: 68
End: 2023-10-16
Payer: MEDICARE

## 2023-10-16 ENCOUNTER — HOSPITAL ENCOUNTER (OUTPATIENT)
Age: 68
Setting detail: OUTPATIENT SURGERY
Discharge: HOME | End: 2023-10-16
Attending: SURGERY | Admitting: SURGERY
Payer: MEDICARE

## 2023-10-16 VITALS
BODY MASS INDEX: 21.58 KG/M2 | DIASTOLIC BLOOD PRESSURE: 70 MMHG | SYSTOLIC BLOOD PRESSURE: 110 MMHG | HEIGHT: 64 IN | OXYGEN SATURATION: 100 % | TEMPERATURE: 97.2 F | HEART RATE: 59 BPM | WEIGHT: 126.4 LBS | RESPIRATION RATE: 18 BRPM

## 2023-10-16 PROCEDURE — 2580000003 HC RX 258: Performed by: ANESTHESIOLOGY

## 2023-10-16 PROCEDURE — 7100000010 HC PHASE II RECOVERY - FIRST 15 MIN: Performed by: SURGERY

## 2023-10-16 PROCEDURE — 3700000000 HC ANESTHESIA ATTENDED CARE: Performed by: SURGERY

## 2023-10-16 PROCEDURE — 7100000011 HC PHASE II RECOVERY - ADDTL 15 MIN: Performed by: SURGERY

## 2023-10-16 PROCEDURE — 2500000003 HC RX 250 WO HCPCS: Performed by: NURSE ANESTHETIST, CERTIFIED REGISTERED

## 2023-10-16 PROCEDURE — 3700000001 HC ADD 15 MINUTES (ANESTHESIA): Performed by: SURGERY

## 2023-10-16 PROCEDURE — 2709999900 HC NON-CHARGEABLE SUPPLY: Performed by: SURGERY

## 2023-10-16 PROCEDURE — 88305 TISSUE EXAM BY PATHOLOGIST: CPT

## 2023-10-16 PROCEDURE — 3609010200 HC COLONOSCOPY ABLATION TUMOR POLYP/OTHER LES: Performed by: SURGERY

## 2023-10-16 PROCEDURE — 6360000002 HC RX W HCPCS: Performed by: NURSE ANESTHETIST, CERTIFIED REGISTERED

## 2023-10-16 PROCEDURE — 45380 COLONOSCOPY AND BIOPSY: CPT | Performed by: SURGERY

## 2023-10-16 RX ORDER — SODIUM CHLORIDE 9 MG/ML
INJECTION, SOLUTION INTRAVENOUS PRN
Status: DISCONTINUED | OUTPATIENT
Start: 2023-10-16 | End: 2023-10-16 | Stop reason: HOSPADM

## 2023-10-16 RX ORDER — LIDOCAINE HYDROCHLORIDE 10 MG/ML
1 INJECTION, SOLUTION INFILTRATION; PERINEURAL
Status: DISCONTINUED | OUTPATIENT
Start: 2023-10-16 | End: 2023-10-16 | Stop reason: HOSPADM

## 2023-10-16 RX ORDER — LIDOCAINE HYDROCHLORIDE 20 MG/ML
INJECTION, SOLUTION EPIDURAL; INFILTRATION; INTRACAUDAL; PERINEURAL PRN
Status: DISCONTINUED | OUTPATIENT
Start: 2023-10-16 | End: 2023-10-16 | Stop reason: SDUPTHER

## 2023-10-16 RX ORDER — SODIUM CHLORIDE 0.9 % (FLUSH) 0.9 %
5-40 SYRINGE (ML) INJECTION PRN
Status: DISCONTINUED | OUTPATIENT
Start: 2023-10-16 | End: 2023-10-16 | Stop reason: HOSPADM

## 2023-10-16 RX ORDER — SODIUM CHLORIDE, SODIUM LACTATE, POTASSIUM CHLORIDE, CALCIUM CHLORIDE 600; 310; 30; 20 MG/100ML; MG/100ML; MG/100ML; MG/100ML
INJECTION, SOLUTION INTRAVENOUS CONTINUOUS
Status: DISCONTINUED | OUTPATIENT
Start: 2023-10-16 | End: 2023-10-16 | Stop reason: HOSPADM

## 2023-10-16 RX ORDER — PROPOFOL 10 MG/ML
INJECTION, EMULSION INTRAVENOUS PRN
Status: DISCONTINUED | OUTPATIENT
Start: 2023-10-16 | End: 2023-10-16 | Stop reason: SDUPTHER

## 2023-10-16 RX ORDER — SODIUM CHLORIDE 0.9 % (FLUSH) 0.9 %
5-40 SYRINGE (ML) INJECTION EVERY 12 HOURS SCHEDULED
Status: DISCONTINUED | OUTPATIENT
Start: 2023-10-16 | End: 2023-10-16 | Stop reason: HOSPADM

## 2023-10-16 RX ADMIN — PROPOFOL 30 MG: 10 INJECTION, EMULSION INTRAVENOUS at 09:28

## 2023-10-16 RX ADMIN — PROPOFOL 70 MG: 10 INJECTION, EMULSION INTRAVENOUS at 09:18

## 2023-10-16 RX ADMIN — SODIUM CHLORIDE, SODIUM LACTATE, POTASSIUM CHLORIDE, AND CALCIUM CHLORIDE: 600; 310; 30; 20 INJECTION, SOLUTION INTRAVENOUS at 09:15

## 2023-10-16 RX ADMIN — PROPOFOL 30 MG: 10 INJECTION, EMULSION INTRAVENOUS at 09:21

## 2023-10-16 RX ADMIN — PROPOFOL 30 MG: 10 INJECTION, EMULSION INTRAVENOUS at 09:19

## 2023-10-16 RX ADMIN — LIDOCAINE HYDROCHLORIDE 60 MG: 20 INJECTION, SOLUTION EPIDURAL; INFILTRATION; INTRACAUDAL; PERINEURAL at 09:18

## 2023-10-16 RX ADMIN — PROPOFOL 20 MG: 10 INJECTION, EMULSION INTRAVENOUS at 09:25

## 2023-10-16 RX ADMIN — PROPOFOL 20 MG: 10 INJECTION, EMULSION INTRAVENOUS at 09:23

## 2023-10-16 ASSESSMENT — PAIN - FUNCTIONAL ASSESSMENT: PAIN_FUNCTIONAL_ASSESSMENT: 0-10

## 2023-10-16 ASSESSMENT — PAIN SCALES - GENERAL
PAINLEVEL_OUTOF10: 0

## 2023-10-16 NOTE — INTERVAL H&P NOTE
Update History & Physical    The patient's History and Physical of 10/12/23 was reviewed with the patient and I examined the patient. There was no change. The surgical site was confirmed by the patient and me. Plan: The risks, benefits, expected outcome, and alternative to the recommended procedure have been discussed with the patient. Patient understands and wants to proceed with the procedure.      Electronically signed by Enzo Jaffe MD on 10/16/2023 at 8:44 AM

## 2023-10-16 NOTE — OP NOTE
[facility]  OPERATIVE REPORT    Name:  Raysa Jackson  MR#:  [id number]  :  1955  ACCOUNT #:  [de-identified]  DATE OF SERVICE:  10/16/2023    PREOPERATIVE DIAGNOSIS:  Request for screening colonoscopy. The patient had never had a colonoscopy. POSTOPERATIVE DIAGNOSES:  1. Rectal polyp was found which was excised. 2.  Overall good prep. 3.  No other mucosal abnormalities were noted other than the rectal polyp. PROCEDURE PERFORMED:  Colonoscopy with polypectomy x1. SURGEON:  Howard Baez MD    ASSISTANT:  None. ANESTHESIA:  Monitored anesthesia care with Dr. Jairo Hager.     COMPLICATIONS:  None. SPECIMENS REMOVED:  Rectal polyp sent to Pathology for evaluation. IMPLANTS:  None. ESTIMATED BLOOD LOSS:  Less than 2 mL. DRAINS:  None. HISTORY:  This is a 43-year-old female who I was asked to see by Dr. Sherrill Singleton for a colonoscopy. The patient is 75years old, never had one. She was seen in the office and went through the procedure, risks of bleeding, infection, anesthesia, injury to the large bowel, and potential for perforation. She was agreeable, signed a consent form. PROCEDURE:  The patient was seen in the preop area, then transported to the [facility]. She was placed on a stretcher in the left lateral decubitus position. Oxygen via nasal cannula was administered, the O2 sats and vital signs were monitored throughout the procedure. The patient had a time-out done identifying the patient, surgeon, procedure and her birth date of 1955. When everyone agreed with the time-out, monitored anesthesia care was done by Dr. Aury Conway, Dr. Jairo Hager and the nurse anesthetist.  Once the sedative effect had taken hold, an adult colonoscope was advanced from the anus all the way to the cecum.   Cecum was identified with the ileocecal valve, cecal folds, external compression of the right lower quadrant corresponded to an indentation seen with the

## 2023-10-16 NOTE — ANESTHESIA POSTPROCEDURE EVALUATION
Department of Anesthesiology  Postprocedure Note    Patient: Jory Cesar  MRN: [id number]  YOB: 1955  Date of evaluation: 10/16/2023      Procedure Summary     Date: 10/16/23 [place] [facility]    Anesthesia Start: 0915 Anesthesia Stop: 4498    Procedure: COLONOSCOPY POLYPECTOMY COLD BIOPSY Diagnosis:       Encounter for screening colonoscopy      (Encounter for screening colonoscopy [Z12.11])    Surgeons: Cyrena Goldmann, MD Responsible Provider: Jorge A Casas MD    Anesthesia Type: TIVA ASA Status: 1          Anesthesia Type: No value filed.     Perry Phase I:      Perry Phase II: Perry Score: 10      Anesthesia Post Evaluation    Patient location during evaluation: PACU  Patient participation: complete - patient participated  Level of consciousness: awake  Airway patency: patent  Nausea & Vomiting: no nausea  Complications: no  Cardiovascular status: blood pressure returned to baseline and hemodynamically stable  Respiratory status: acceptable  Hydration status: stable  Multimodal analgesia pain management approach  Pain management: adequate

## 2023-10-16 NOTE — PROGRESS NOTES
1011-Discharge instructions were reviewed with patient's , Marquis Tai. An opportunity was given for questions. Patient and Marquis Murphys verbalized understanding, and has no questions at this time. 1025-To lobby via wheelchair accompanied by staff. Discharged to home in good condition via private vehicle.

## 2023-10-17 NOTE — PROGRESS NOTES
Post op call completed. Pt stated she is doing great. Pt denied n/v, pain, or difficulties going to the restroom. Pt asked about the results of the polypectomy. RN reiterated that is may take a few days for the test results to pop up on [facility], however she is free to call Dr. Alcocer Notice office if she has any questions.

## 2023-10-20 PROBLEM — Z12.11 ENCOUNTER FOR SCREENING COLONOSCOPY: Status: RESOLVED | Noted: 2023-09-20 | Resolved: 2023-10-20

## 2024-10-02 ENCOUNTER — TELEPHONE (OUTPATIENT)
Dept: OTHER | Facility: CLINIC | Age: 69
End: 2024-10-02

## 2024-10-02 NOTE — TELEPHONE ENCOUNTER
Patient was outreached to as part of Population Health scheduling activity. Patient may be due for a wellness visit with their primary care provider.    Outcome of call: PATIENT DECLINED TO SCHEDULE

## (undated) DEVICE — TRAP SPEC POLYP REM STRNR CLN DSGN MAGNIFYING WIND DISP

## (undated) DEVICE — CANNULA NSL ORAL AD FOR CAPNOFLEX CO2 O2 AIRLFE

## (undated) DEVICE — SNARE POLYP SM W13MMXL240CM SHTH DIA2.4MM OVL FLX DISP

## (undated) DEVICE — SYRINGE MED 5ML STD CLR PLAS LUERLOCK TIP N CTRL DISP

## (undated) DEVICE — SYRINGE MED 3ML CLR PLAS STD N CTRL LUERLOCK TIP DISP

## (undated) DEVICE — CONNECTOR TBNG OD5-7MM O2 END DISP

## (undated) DEVICE — NEEDLE SYR 18GA L1.5IN RED PLAS HUB S STL BLNT FILL W/O

## (undated) DEVICE — CONTAINER FORMALIN PREFILLED 10% NBF 60ML

## (undated) DEVICE — KENDALL RADIOLUCENT FOAM MONITORING ELECTRODE RECTANGULAR SHAPE: Brand: KENDALL

## (undated) DEVICE — ELECTRODE PT RET AD L9FT HI MOIST COND ADH HYDRGEL CORDED

## (undated) DEVICE — ENDOSCOPIC KIT 1.1+ OP4 CA DE 2 GWN AAMI LEVEL 3

## (undated) DEVICE — FORCEPS BX L240CM JAW DIA2.8MM L CAP W/ NDL MIC MESH TOOTH